# Patient Record
Sex: MALE | Race: WHITE | NOT HISPANIC OR LATINO | Employment: OTHER | URBAN - METROPOLITAN AREA
[De-identification: names, ages, dates, MRNs, and addresses within clinical notes are randomized per-mention and may not be internally consistent; named-entity substitution may affect disease eponyms.]

---

## 2018-10-10 ENCOUNTER — EST. PATIENT EMERGENCY (OUTPATIENT)
Dept: URBAN - METROPOLITAN AREA CLINIC 43 | Facility: CLINIC | Age: 63
End: 2018-10-10

## 2018-10-10 DIAGNOSIS — H01.003: ICD-10-CM

## 2018-10-10 DIAGNOSIS — B02.39: ICD-10-CM

## 2018-10-10 DIAGNOSIS — H04.123: ICD-10-CM

## 2018-10-10 PROCEDURE — 92012 INTRM OPH EXAM EST PATIENT: CPT

## 2018-10-10 RX ORDER — NEOMYCIN SULFATE, POLYMYXIN B SULFATE AND DEXAMETHASONE 3.5; 10000; 1 MG/G; [USP'U]/G; MG/G
OINTMENT OPHTHALMIC TWICE A DAY
Start: 2018-10-10 | End: 2018-10-24

## 2018-10-10 RX ORDER — BUSPIRONE HYDROCHLORIDE 10 MG/1
1 TABLET ORAL
Start: 2018-10-10 | End: 2018-10-20

## 2018-10-10 ASSESSMENT — VISUAL ACUITY
OS_SC: 20/20-1
OD_SC: 20/20-3

## 2018-10-10 ASSESSMENT — TONOMETRY: OD_IOP_MMHG: 18

## 2021-12-21 NOTE — PATIENT DISCUSSION
order trials to  without appointment.  We did not have them in stock and patient given Nixon alfredo to get by.